# Patient Record
(demographics unavailable — no encounter records)

---

## 2025-04-03 NOTE — HISTORY OF PRESENT ILLNESS
[FreeTextEntry1] : Follow up of type 1 DM, vitamin D Deficiency, overweight and hyperlipidemia History of suboptimal TSH during pregnancy and took LT4, but TFTs normal outside of pregnancy.    Quality:  type 1 DM Severity:  well controlled Duration of diabetes: since 12/2011 Associated Complications/ Symptoms:  no known microvascular complications.  Modifying Factors:  Better with insulin pump  Current Diabetic Medication Regimen: Omnipod 5 insulin pump (upgraded in 1/2023) Zepbound was added 12/2023 for BMI of 28.      Currently on 5 mg every 2 weeks.      SMBG:  prior to CGM, was testing over 4 times a day. Currently using  Dexcom CGM G6.   over past 14 days average BG is 172 mg/dl with CV of 35%.   65% of values are within range, 35% above range and 0% below range.

## 2025-04-03 NOTE — ASSESSMENT
[FreeTextEntry1] : 46 year old female with Type 1 DM, hyperlipidemia and HTN here for follow up. Her diabetes is well controlled.   1. Type 1 DM-   Continue current insulin pump settings.    2. Hyperlipidemia-  Continue Atorvastatin 20 mg daily. 3. HTN- continue Candesartan 4. Overweight-    Now that weight is at goal, Agree with weaning Zepbound.  For next refill, would recommend decreasing to 2.5 mg dose.          Follow up in 4 -6 months.  CGM STATEMENT: This patient with diabetes - Is currently using CGM and is receiving insulin using an insulin pump - Is adjusting insulin dose using a correction factor programmed into the pump, as documented in the medical record - Has been seen in the office by a provider within the last six months to review CGM data with their provider - Has completed a diabetes education program Manpreet, Dexcom and Guardian 4 CGM require one test strip daily to use in case of sensor failure or for blood glucose verification or during sensor warmup. Guardian 3 CGM requires 6 test strips daily for calibration with automated pump and blood glucose correction. CGM is medically necessary as it can integrate with their insulin pump to allow for closed loop therapy.